# Patient Record
Sex: MALE | Race: WHITE | NOT HISPANIC OR LATINO | Employment: STUDENT | URBAN - METROPOLITAN AREA
[De-identification: names, ages, dates, MRNs, and addresses within clinical notes are randomized per-mention and may not be internally consistent; named-entity substitution may affect disease eponyms.]

---
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
## 2020-04-10 ENCOUNTER — VIRTUAL VISIT (OUTPATIENT)
Dept: FAMILY MEDICINE | Facility: CLINIC | Age: 20
End: 2020-04-10
Payer: COMMERCIAL

## 2020-04-10 DIAGNOSIS — Z91.09 ENVIRONMENTAL ALLERGIES: ICD-10-CM

## 2020-04-10 DIAGNOSIS — J30.89 SEASONAL ALLERGIC RHINITIS DUE TO OTHER ALLERGIC TRIGGER: Primary | ICD-10-CM

## 2020-04-10 PROCEDURE — 99213 OFFICE O/P EST LOW 20 MIN: CPT | Mod: TEL | Performed by: FAMILY MEDICINE

## 2020-04-10 RX ORDER — IPRATROPIUM BROMIDE 21 UG/1
1-2 SPRAY, METERED NASAL EVERY 12 HOURS PRN
Qty: 30 ML | Refills: 11 | Status: SHIPPED | OUTPATIENT
Start: 2020-04-10

## 2020-04-10 RX ORDER — LORATADINE 10 MG/1
10 TABLET ORAL DAILY
COMMUNITY

## 2020-04-10 NOTE — PROGRESS NOTES
"  SUBJECTIVE:                                                    Jose Blount is a 19 year old male who is being evaluated via a telephone visit, secondary to COVID-19 coronavirus pandemic, as we are trying to minimize patient exposure to the virus,  which is now widespread in the state.      The patient has been notified of following:     \"This telephone visit will be conducted via a call between you and your physician/provider. We have found that certain health care needs can be provided without the need for a physical exam.  This service lets us provide the care you need with a short phone conversation.  If a prescription is necessary we can send it directly to your pharmacy.  If lab work is needed we can place an order for that and you can then stop by our lab to have the test done at a later time.    Telephone visits are billed at different rates depending on your insurance coverage. During this emergency period, for some insurers they may be billed the same as an in-person visit.  Please reach out to your insurance provider with any questions.    If during the course of the call the physician/provider feels a telephone visit is not appropriate, you will not be charged for this service.\"     Patient has given verbal consent for Telephone visit?  Yes    Jose Blount complains of cough ? Secondary       ALLERGIES  Patient has no known allergies.    Acute Illness:    Acute illness concerns: cough  Onset: 6 weeks ago when he got home from [place].     Fever: no    Chills/Sweats: no    Headache (location?): YES - located on the middle of lower forehead - usually just in the am when he wakes up - then goes away when he is up and moving around.  And the top/back of his head that would come and go quickly - more like a muscle spasm.     Sinus Pressure:YES     Conjunctivitis:  no    Ear Pain: no    Rhinorrhea: YES    Congestion: YES    Sore Throat: YES- in the morning- ? From post-nasal drainage.       Cough: " YES-productive of clear sputum, dry and barky during the night,  productive of green sputum more in the morning.       Wheeze: YES- sometimes, but no chest tightness or shortness of breath.       Decreased Appetite: no    Nausea: no    Vomiting: no    Diarrhea:  no    Dysuria/Freq.: no    Fatigue/Achiness: no    Sick/Strep Exposure: no    ? Headache in the am from singulair?  Started about that time.      Hasn't been taking the QVAR inhaler - that never really helped. Was rx'd by allergist 7/2019.    Just started taking claritin 10mg po daily the last 2 days and feels much better - no runny nose and not as congested at all. Still coughing though.      Was taking allegra 180mg po daily and then BID and that didn't help at all. Zyrtec didn't help.      ? Some Chills 2 nights ago , but gone today.      Therapies Tried and outcome: sudafed    Patient Active Problem List   Diagnosis     Anxiety     Environmental allergies - birch with cross-reactivity with avocados and peaches, plums, etc, grass, a lot of trees , mold, aristeo - no Epi-pen needed per Allergist      Wheezing     Adjustment disorder with anxious mood       Current Outpatient Medications   Medication Sig Dispense Refill     loratadine (CLARITIN) 10 MG tablet Take 10 mg by mouth daily       montelukast (SINGULAIR) 10 MG tablet TAKE 1 TABLET BY MOUTH EVERY EVENING  3     QVAR REDIHALER 40 MCG/ACT inhaler TAKE 2 PUFF(S) 1-2 TIMES PER DAY  4          Allergies   Allergen Reactions     Seasonal Allergies      Zyrtec [Cetirizine] Other (See Comments)     Was nasty, crabby, etc as a young child               Recent Labs   Lab Test 02/15/19  1039 05/09/18  1538   ALT  --  31   CR 0.96 0.87   GFRESTIMATED >90 >90   GFRESTBLACK >90 >90   POTASSIUM 4.1 4.1   TSH  --  1.38      BP Readings from Last 3 Encounters:   08/14/19 108/58   06/14/19 110/78   02/15/19 104/70    Wt Readings from Last 3 Encounters:   08/14/19 72.1 kg (159 lb) (58 %)*   06/14/19 72.2 kg (159 lb 3.2  oz) (60 %)*   02/15/19 62.2 kg (137 lb 3.2 oz) (25 %)*     * Growth percentiles are based on CDC (Boys, 2-20 Years) data.                    Reviewed and updated as needed this visit by Provider         Review of Systems   ROS COMP: Constitutional, HEENT, cardiovascular, pulmonary, GI, , musculoskeletal, neuro, skin, endocrine and psych systems are negative, except as otherwise noted.       Objective   Reported vitals:  There were no vitals taken for this visit as this was a telephone visit.   Sounds healthy, alert and no distress  Psych: Alert and oriented times 3; coherent speech, normal   rate and volume, able to articulate logical thoughts, able   to abstract reason, no tangential thoughts, no hallucinations   or delusions  His affect is upbeat and normal.     Diagnostic Test Results:  Labs reviewed in Epic        Assessment/Plan:    ICD-10-CM    1. Seasonal allergic rhinitis due to other allergic trigger  J30.89 ipratropium (ATROVENT) 0.03 % nasal spray   2. Environmental allergies - birch with cross-reactivity with avocados and peaches, plums, etc, grass, a lot of trees , mold, aristeo - no Epi-pen needed per Allergist   Z91.09         Return in about 1 month (around 5/10/2020) for or sooner, if symptoms not improving. .ok for video or telephone visit at that time.      Ok to hold Singulair and see that helps your morning headache.   Consider nasal inhaled steroid, such as flonase and nasonex , if the atrovent nasal spray doesn't work.  Continue with claritin 10mg by mouth daily for allergies as they seem to work better.   Purchase and place anti- allergy pillow cases and mattress pads.   Wash comforter and dry thoroughly every 3 months or so.  May need to do this at a laundromat for the oversize washed and dryers.       Phone call duration:  17 minutes 34 seconds.         Raquel Stiles MD    [facility]

## 2020-04-10 NOTE — PATIENT INSTRUCTIONS
Return in about 1 month (around 5/10/2020) for or sooner, if symptoms not improving.  Ok for video or telephone visit at that time.      Ok to hold Singulair and see that helps your morning headache.     Consider nasal inhaled steroid, such as flonase and nasonex , if the atrovent nasal spray doesn't work.  Continue with claritin 10mg by mouth daily for allergies as they seem to work better than allegra or zyrtec.     Purchase and place anti-allergy pillow cases and mattress pads- available at [employer].     Wash comforter and dry thoroughly every 3 months or so.  May need to do this at a laundromat for the oversize washed and dryers.              Thank you so much for doing a telephone visit with us today. And, again, thank you  for choosing our [facility].  Please contact us with any questions that you may have.   We appreciate the opportunity to serve you now and look forward to supporting your healthcare needs for a long time to come!    Our clinic for the foreseeable future and until further notice , will continue to offer virtual visits, including telephone visits, video visits,  and e-visits, especially during this period of COVID-19 coronavirus pandemic.  Please call to schedule another one if you need it!        Most Sincerely,     Raquel Stiles MD                                              To reach your [facility] care team after hours call:   [phone]    PLEASE NOTE OUR HOURS HAVE CHANGED secondary to COVID-19 coronavirus pandemic, as we are trying to minimize patient exposure to the virus,  which is now widespread in the [facility].  These hours may change with very little notice.  We apologize for any inconvenience.       Our current in person clinic hours are:  Monday- Friday:  9:00am - 4:00pm                                                          Saturday and Sunday : Closed to in person visits    We have telephone and virtual visit  times available between 7:40am - 6pm on Mondays,                                                      and 7:40am - 5pm Tuesdays through Fridays.                  Phone:  [phone]    Our pharmacy hours:   Monday  9:00 am to 6:00 pm              Tuesday through Friday 9:00am to 5:00pm                        Saturday - 9:00 am to 12 noon       Sunday : Closed.              Phone:  [phone]      There is also information available at our web site:  [email or web address]    If your provider ordered any lab tests and you do not receive the results within 10 business days, please call the clinic.    If you need a medication refill please contact your pharmacy.  Please allow 2 business days for your refill to be completed.    Our clinic offers telephone visits and e visits.  Please ask one of your team members to explain more.      Use [employer] (secure email communication and access to your chart) to send your primary care provider a message or make an appointment. Ask someone on your Team how to sign up for [employer].     Pharmacy is drive-thru only at this time secondary to the COVID-19 coronavirus pandemic, as we are trying to minimize patient exposure to the virus,  which is now widespread in the state.        There is also information available at our web site:  [email or web address]    If your provider ordered any lab tests and you do not receive the results within 10 business days, please call the clinic.    If you need a medication refill please contact your pharmacy.  Please allow 2 business days for your refill to be completed.

## 2020-10-17 ENCOUNTER — TRANSFERRED RECORDS (OUTPATIENT)
Dept: HEALTH INFORMATION MANAGEMENT | Facility: CLINIC | Age: 20
End: 2020-10-17

## 2020-10-19 ENCOUNTER — TRANSFERRED RECORDS (OUTPATIENT)
Dept: HEALTH INFORMATION MANAGEMENT | Facility: CLINIC | Age: 20
End: 2020-10-19

## 2020-12-14 ENCOUNTER — TRANSFERRED RECORDS (OUTPATIENT)
Dept: HEALTH INFORMATION MANAGEMENT | Facility: CLINIC | Age: 20
End: 2020-12-14

## 2022-05-09 ENCOUNTER — E-VISIT (OUTPATIENT)
Dept: FAMILY MEDICINE | Facility: CLINIC | Age: 22
End: 2022-05-09
Payer: COMMERCIAL

## 2022-05-09 DIAGNOSIS — F41.9 ANXIETY: Primary | ICD-10-CM

## 2022-05-09 PROCEDURE — 99421 OL DIG E/M SVC 5-10 MIN: CPT | Performed by: FAMILY MEDICINE

## 2022-05-09 NOTE — LETTER
[facility]  [address] [place] [id number]  Office: [phone]   Fax:    [phone]       May 10, 2022    Jose Blount                                                                                                                     [id number] [facility] [address]          To whom it may concern,     Please allow Jose Blount to have a kitten/cat as an emotional support animal in his apartment/home.  He requires this as a medical necessity.  Thank you in advance for your understanding.     Sincerely,              Raquel Stiles MD

## 2022-05-11 NOTE — TELEPHONE ENCOUNTER
Provider E-Visit time total (minutes): 10    Future Appointments 5/10/2022 - 11/6/2022            None        See my chart message reply.

## 2022-05-11 NOTE — PATIENT INSTRUCTIONS
[facility]  [address] [place] [address]  Office: [phone]   Fax:    [phone]            Thank you so much for doing an e-visit with us today. And, again, thank you  for choosing our [facility].  Please contact us with any questions that you may have.   We appreciate the opportunity to serve you now and look forward to supporting your healthcare needs for a long time to come!    Our clinic for the foreseeable future and until further notice , will continue to offer virtual visits, including telephone visits, video visits,  and e-visits, especially during this period of COVID-19 coronavirus pandemic.  Please call to schedule another one if you need it!        Most Sincerely,     Raquel Stiles MD                                              To reach your [facility] care team after hours call:   [phone]    PLEASE NOTE OUR HOURS HAVE CHANGED secondary to COVID-19 coronavirus pandemic, as we are trying to minimize patient exposure to the virus,  which is now widespread in the [facility].  These hours may change with very little notice.  We apologize for any inconvenience.       Our current clinic hours are:    Our current clinic hours are:         Monday- Thursday   7:00am - 6:00pm  in person.      Friday  7:00am- 5:00pm                       Saturday and Sunday : Closed to in person and virtual visits        We have telephone and virtual visit times available between    7:00am - 6pm on Monday-Friday as well.                                                Phone:  [phone]      Our pharmacy hours:Monday  through Friday 8:00am to 5:00pm                        Saturday - 9:00 am to 12 noon         Sunday : Closed.                ###  Please note: at this time we are not accepting any walk-in visits. ###      There is also information available at our web site:  [email or web address]    If your provider ordered any  lab tests and you do not receive the results within 10 business days, please call the clinic.    If you need a medication refill please contact your pharmacy.  Please allow 2 business days for your refill to be completed.    Our clinic offers telephone visits and e visits.  Please ask one of your team members to explain more.      Use [employer] (secure email communication and access to your chart) to send your primary care provider a message or make an appointment. Ask someone on your Team how to sign up for [employer].     Pharmacy is drive-thru only at this time secondary to the COVID-19 coronavirus pandemic, as we are trying to minimize patient exposure to the virus,  which is now widespread in the state.        There is also information available at our web site:  [email or web address]    If your provider ordered any lab tests and you do not receive the results within 10 business days, please call the clinic.    If you need a medication refill please contact your pharmacy.  Please allow 3 business days for your refill to be completed.

## 2022-06-25 ENCOUNTER — HEALTH MAINTENANCE LETTER (OUTPATIENT)
Age: 22
End: 2022-06-25

## 2022-11-20 ENCOUNTER — HEALTH MAINTENANCE LETTER (OUTPATIENT)
Age: 22
End: 2022-11-20

## 2023-07-08 ENCOUNTER — HEALTH MAINTENANCE LETTER (OUTPATIENT)
Age: 23
End: 2023-07-08

## 2024-08-25 ENCOUNTER — HEALTH MAINTENANCE LETTER (OUTPATIENT)
Age: 24
End: 2024-08-25